# Patient Record
Sex: MALE | Race: BLACK OR AFRICAN AMERICAN | URBAN - METROPOLITAN AREA
[De-identification: names, ages, dates, MRNs, and addresses within clinical notes are randomized per-mention and may not be internally consistent; named-entity substitution may affect disease eponyms.]

---

## 2017-05-16 ENCOUNTER — HISTORICAL (OUTPATIENT)
Dept: MEDSURG UNIT | Facility: HOSPITAL | Age: 82
End: 2017-05-16

## 2017-05-16 LAB
ABS NEUT (OLG): 5.04 X10(3)/MCL (ref 2.1–9.2)
APTT PPP: 33.1 SECOND(S) (ref 20.6–36)
BASOPHILS # BLD AUTO: 0 X10(3)/MCL (ref 0–0.2)
BASOPHILS NFR BLD AUTO: 0 %
BNP BLD-MCNC: 219 PG/ML (ref 0–125)
BUN SERPL-MCNC: 19 MG/DL (ref 7–18)
CALCIUM SERPL-MCNC: 8.6 MG/DL (ref 8.5–10.1)
CHLORIDE SERPL-SCNC: 114 MMOL/L (ref 98–107)
CO2 SERPL-SCNC: 25 MMOL/L (ref 21–32)
CREAT SERPL-MCNC: 1.11 MG/DL (ref 0.7–1.3)
EOSINOPHIL # BLD AUTO: 0 X10(3)/MCL (ref 0–0.9)
EOSINOPHIL NFR BLD AUTO: 0 %
ERYTHROCYTE [DISTWIDTH] IN BLOOD BY AUTOMATED COUNT: 16.3 % (ref 11.5–17)
GLUCOSE SERPL-MCNC: 106 MG/DL (ref 74–106)
HCT VFR BLD AUTO: 37.2 % (ref 42–52)
HGB BLD-MCNC: 11.3 GM/DL (ref 14–18)
INR PPP: 1.19 (ref 0–1.27)
LYMPHOCYTES # BLD AUTO: 1.1 X10(3)/MCL (ref 0.6–4.6)
LYMPHOCYTES NFR BLD AUTO: 16 %
MAGNESIUM SERPL-MCNC: 2.2 MG/DL (ref 1.8–2.4)
MCH RBC QN AUTO: 26.2 PG (ref 27–31)
MCHC RBC AUTO-ENTMCNC: 30.4 GM/DL (ref 33–36)
MCV RBC AUTO: 86.1 FL (ref 80–94)
MONOCYTES # BLD AUTO: 0.5 X10(3)/MCL (ref 0.1–1.3)
MONOCYTES NFR BLD AUTO: 8 %
NEUTROPHILS # BLD AUTO: 5.04 X10(3)/MCL (ref 1.4–7.9)
NEUTROPHILS NFR BLD AUTO: 75 %
PLATELET # BLD AUTO: 110 X10(3)/MCL (ref 130–400)
PMV BLD AUTO: 12.5 FL (ref 9.4–12.4)
POTASSIUM SERPL-SCNC: 4 MMOL/L (ref 3.5–5.1)
PROTHROMBIN TIME: 14.9 SECOND(S) (ref 12.1–14.2)
RBC # BLD AUTO: 4.32 X10(6)/MCL (ref 4.7–6.1)
SODIUM SERPL-SCNC: 147 MMOL/L (ref 136–145)
WBC # SPEC AUTO: 6.8 X10(3)/MCL (ref 4.5–11.5)

## 2022-04-30 NOTE — OP NOTE
DATE OF SURGERY:    05/16/2017    SURGEON:  Emilio Hernandez MD    PROCEDURES PERFORMED:    1. Comprehensive EP study without induction of arrhythmia.  2. Left atrial pacing and recording from coronary sinus catheter.  3. Program stimulation pacing after IV drug infusion.  4. Ablation of the cavotricuspid isthmus for typical atrial flutter.  5. 3D electroanatomical mapping.    COMPLICATIONS:  None.    ESTIMATED BLOOD LOSS:  Minimal.    INDICATIONS:  The patient is an 85-year-old white male who has had persistent atrial flutter and is undergoing EP study for definitive diagnosis as well as attempted ablation for definitive therapy.    PROCEDURE IN DETAIL:  After risks, benefits, and alternatives were explained to the patient, informed consent was obtained.  Patient was brought to the EP lab in a fasting, nonsedated state.  Sedation for the procedure was accomplished by the anesthesiology team.  The bilateral groins were prepped and draped in the usual sterile fashion.  Using 1% lidocaine, the right groin was anesthetized.  Using modified Seldinger technique, access obtained to the right femoral vein on 3 separate occasions where two 7-Grenadian and one 6-Grenadian hemostatic sheaths were placed.  I then advanced a [employer] Dynamic decapolar CS catheter through the first 7-Grenadian sheath to the level of the heart and into the coronary sinus for left atrial pacing and recording.  I then advanced a Duodeca halo catheter as well as a CRD2 catheter through the 7-Grenadian and 6-Grenadian sheaths respectively.  The Duodeca halo catheter was placed in the right atrium along the clem terminalis for right atrial pacing and recording.  The CRD2 catheter was placed in the His bundle region for His bundle recording.  Baseline measures were obtained.     The patient did present in a counterclockwise activating right atrial flutter.  I proceeded with entrainment of the tachycardia with pacing from the distal halo catheter.  This did  demonstrate concealed entrainment, and with termination of pacing, the post-pace interval was consistent with being within tachycardia circuit.  I also performed entrainment from the proximal coronary sinus, which also did demonstrate concealed entrainment.  With the termination of pacing, the tachycardia persisted with a post-pacing interval consistent with being in a tachycardia circuit.  With these findings, I concluded the patient had typical isthmus-dependent atrial flutter.     I then removed the CRD2 catheter from the body and exchanged the 6-Uzbek sheath for an SRO sheath.  I then advanced a [employer] FlexAbility F-J curve 4 mm tip irrigated ablation catheter through the SRO sheath.  The ablation catheter was placed in the distal aspect of the cavotricuspid isthmus in the 6 o'clock position.  I then performed drag line ablation lesions through the cavotricuspid isthmus.  When we were in the very proximal aspect of the cavotricuspid isthmus, there was termination of atrial flutter.  When this occurred, I performed pacing from the proximal coronary sinus, which demonstrated unidirectional block.  This line was completed to the inferior vena cava.  The ablation catheter was then placed in the distal aspect of this prior ablation line, and I performed ablation in the areas where there were adequate atrial signals.  This line was also completed to the inferior vena cava.     I then exchanged the ablation catheter for the CRD2 catheter.  The CRD2 catheter was placed back in the His bundle region, and post-ablation baseline measurements were obtained.  I then proceeded with the post-ablation atrial study, where I performed atrial burst pacing, decremental pacing, and atrial extrastimuli.  I was unable to reinduce tachycardia.  After 15 minutes, the patient was then given 12 mg of IV adenosine to help depolarize the atrial tissue.  When this was performed, I performed pacing from the proximal coronary sinus as well  as the distal halo and confirmed continued bidirectional block with no evidence of reconnection of conduction through the cavotricuspid isthmus.  After 20 minutes, I performed pacing again from the proximal coronary sinus and distal halo and confirmed continued bidirectional block.  At this point, I decided to terminate the procedure.     The catheters were removed from the body, the sheaths were flushed, and the sheaths were removed from the body.  Hemostasis was obtained with bimanual compression.     3D mapping was used throughout the procedure to aimee the locations of catheters as well as our ablation lesions.    BASELINE MEASUREMENTS:  The patient presented in a counterclockwise activating right atrial flutter with a cycle length of 235 msec.  There was concentric activation of the left atrium.  AV node:  The H-V interval was 40 msec.  Ventricle:  QRS duration was 85 msec, and the QT interval was 306 msec.    Tachycardia characterization:  When we performed entrainment from the distal halo catheter, this did demonstrate concealed entrainment, and with termination of pacing, the post-pacing interval was 250 msec.  I also performed concealed entrainment from the proximal coronary sinus, and with termination of pacing, the post-pacing interval was 250 msec.    Ablation:  I performed a total number of 2 ablation lesions for a total time of 10 minutes.  The average temperature was 27 degrees Celsius, and the average power was 29 castle.  Ablation was performed through the cavotricuspid isthmus at the 6 o'clock position using drag line ablation lesions.    POST-ABLATION STUDY:  Baseline Measurements:  Sinus Node:  The sinus cycle length was 720 msec.  AV Node:  The VA interval was 131 msec, the AH interval was 50 msec, the HV interval was 42 msec.  Ventricle:  QRS duration was 85 msec, and QT interval was 361 msec.    Atrial Study:  The AV block cycle length was found at 310 msec.  The AV node ERP was found at 260  msec after a drive train of 600 msec.  The atrial ERP was found at 210 msec after a drive train of 600 msec.    Transisthmus Conduction:  The post-ablation transisthmus conduction time with pacing from the proximal coronary sinus to the distal halo was 145 msec, and with pacing from the distal halo to the proximal coronary sinus was 147 msec.  There was positive differential pacing as when I paced from halo electrodes 3, 4, the transisthmus conduction time to the proximal coronary sinus was 135 msec.    IMPRESSION:    Typical isthmus-dependent atrial flutter.  Successful ablation of the cavotricuspid isthmus with termination of atrial flutter and achievement of bidirectional block.    PLAN:    Patient will be monitored overnight for any complications of the procedure.  He will receive half-dose Lovenox 8 hours after hemostasis is obtained.  I am going to discontinue Lopressor therapy and start patient on Toprol 25 mg daily.  Patient will be restarted on Eliquis therapy tomorrow morning.        ______________________________  Emilio Hernandez MD MS/SHWETA  DD:  05/16/2017  Time:  02:02PM  DT:  05/17/2017  Time:  03:16AM  Job #:  [id number]